# Patient Record
(demographics unavailable — no encounter records)

---

## 2025-05-27 NOTE — HISTORY OF PRESENT ILLNESS
[FreeTextEntry1] : Annual physical  [de-identified] : 47 year old female presents for an annual physical. She feels she may be in perimenopause- reports feeling fatigued at times, finds herself falling asleep earlier, lacking energy and motivation. She does continue to menstruate monthly. She has been busy- works regularly and helps to care for her children (ages 11, 13, 15, 16, and 17).   Has h/o Grave's disease s/p radioactive iodine in 2002, now with Hypothyroidism, on Levothyroxine Not currently following with an endocrinologist Currently on Levothyroxine 125 mcg daily   Has anxiety- on Sertraline 50 mg daily  feels her anxiety is managed well on current dose also on Bupropion which has helped improve her sexual desire/libido   overdue for GYN  due for colon cancer screening  h/o asthma in childhood h/o exercise induced asthma in adulthood doing well

## 2025-05-27 NOTE — HEALTH RISK ASSESSMENT
[Yes] : Yes [2 - 3 times a week (3 pts)] : 2 - 3  times a week (3 points) [1 or 2 (0 pts)] : 1 or 2 (0 points) [Never (0 pts)] : Never (0 points) [No] : In the past 12 months have you used drugs other than those required for medical reasons? No [0] : 2) Feeling down, depressed, or hopeless: Not at all (0) [PHQ-2 Negative - No further assessment needed] : PHQ-2 Negative - No further assessment needed [Never] : Never [Patient reported mammogram was normal] : Patient reported mammogram was normal [Audit-CScore] : 3 [de-identified] : Not exercising regularly  [de-identified] : Diet is generally good but can improve at times  [[id number]] : 0 [MammogramDate] : 01/2024

## 2025-05-27 NOTE — ASSESSMENT
[FreeTextEntry1] : Health care maintenance: check blood work, blood drawn in office follow up with optometry/ophthalmology and dentist for routine exams when due follow up with dermatology  referred to GYN referred for updated mammogram, breast ultrasound due for colon cancer screening- referred to Gastroenterology   Hypothyroidism: c/w Levothyroxine 125 mcg daily will check blood work, reassess  recommend f/u with endocrinology   Hyperlipidemia: improve upon diet and exercise check blood work   Asthma: c/w Albuterol PRN   Anxiety: stable c/w Sertraline, Bupropion   [Vaccines Reviewed] : Immunizations reviewed today. Please see immunization details in the vaccine log within the immunization flowsheet.